# Patient Record
Sex: MALE | Race: WHITE | NOT HISPANIC OR LATINO
[De-identification: names, ages, dates, MRNs, and addresses within clinical notes are randomized per-mention and may not be internally consistent; named-entity substitution may affect disease eponyms.]

---

## 2018-02-24 ENCOUNTER — TRANSCRIPTION ENCOUNTER (OUTPATIENT)
Age: 58
End: 2018-02-24

## 2018-09-17 ENCOUNTER — TRANSCRIPTION ENCOUNTER (OUTPATIENT)
Age: 58
End: 2018-09-17

## 2019-08-14 ENCOUNTER — APPOINTMENT (OUTPATIENT)
Dept: PULMONOLOGY | Facility: CLINIC | Age: 59
End: 2019-08-14
Payer: COMMERCIAL

## 2019-08-14 VITALS
DIASTOLIC BLOOD PRESSURE: 82 MMHG | WEIGHT: 254 LBS | SYSTOLIC BLOOD PRESSURE: 126 MMHG | HEIGHT: 74 IN | HEART RATE: 92 BPM | BODY MASS INDEX: 32.6 KG/M2 | OXYGEN SATURATION: 95 %

## 2019-08-14 DIAGNOSIS — Z86.39 PERSONAL HISTORY OF OTHER ENDOCRINE, NUTRITIONAL AND METABOLIC DISEASE: ICD-10-CM

## 2019-08-14 DIAGNOSIS — Z86.79 PERSONAL HISTORY OF OTHER DISEASES OF THE CIRCULATORY SYSTEM: ICD-10-CM

## 2019-08-14 DIAGNOSIS — Z82.5 FAMILY HISTORY OF ASTHMA AND OTHER CHRONIC LOWER RESPIRATORY DISEASES: ICD-10-CM

## 2019-08-14 DIAGNOSIS — Z87.09 PERSONAL HISTORY OF OTHER DISEASES OF THE RESPIRATORY SYSTEM: ICD-10-CM

## 2019-08-14 DIAGNOSIS — Z78.9 OTHER SPECIFIED HEALTH STATUS: ICD-10-CM

## 2019-08-14 DIAGNOSIS — R93.89 ABNORMAL FINDINGS ON DIAGNOSTIC IMAGING OF OTHER SPECIFIED BODY STRUCTURES: ICD-10-CM

## 2019-08-14 DIAGNOSIS — R06.02 SHORTNESS OF BREATH: ICD-10-CM

## 2019-08-14 DIAGNOSIS — Z87.891 PERSONAL HISTORY OF NICOTINE DEPENDENCE: ICD-10-CM

## 2019-08-14 DIAGNOSIS — R94.2 ABNORMAL RESULTS OF PULMONARY FUNCTION STUDIES: ICD-10-CM

## 2019-08-14 DIAGNOSIS — E66.9 OBESITY, UNSPECIFIED: ICD-10-CM

## 2019-08-14 DIAGNOSIS — J98.11 ATELECTASIS: ICD-10-CM

## 2019-08-14 PROCEDURE — 94060 EVALUATION OF WHEEZING: CPT

## 2019-08-14 PROCEDURE — 94729 DIFFUSING CAPACITY: CPT

## 2019-08-14 PROCEDURE — 99204 OFFICE O/P NEW MOD 45 MIN: CPT | Mod: 25

## 2019-08-14 PROCEDURE — 94727 GAS DIL/WSHOT DETER LNG VOL: CPT

## 2019-08-14 PROCEDURE — 85018 HEMOGLOBIN: CPT | Mod: QW

## 2019-08-14 PROCEDURE — 94664 DEMO&/EVAL PT USE INHALER: CPT | Mod: 59

## 2019-08-14 RX ORDER — LISINOPRIL 10 MG/1
10 TABLET ORAL
Refills: 0 | Status: ACTIVE | COMMUNITY

## 2019-08-14 RX ORDER — LEVOTHYROXINE SODIUM 0.09 MG/1
88 TABLET ORAL
Refills: 0 | Status: ACTIVE | COMMUNITY

## 2019-08-14 RX ORDER — SIMVASTATIN 20 MG/1
20 TABLET, FILM COATED ORAL
Refills: 0 | Status: ACTIVE | COMMUNITY

## 2019-08-14 NOTE — REVIEW OF SYSTEMS
[Hypertension] : ~T hypertension [Thyroid Problem] : thyroid problem [Fever] : no fever [Dry Eyes] : no dryness of the eyes [Chills] : no chills [Nasal Congestion] : no nasal congestion [Eye Irritation] : no ~T irritation of the eyes [Postnasal Drip] : no postnasal drip [Epistaxis] : no nosebleeds [Cough] : no cough [Sinus Problems] : no sinus problems [Sputum] : not coughing up ~M sputum [Dyspnea] : no dyspnea [Chest Tightness] : no chest tightness [Pleuritic Pain] : no pleuritic pain [Chest Discomfort] : no chest discomfort [Wheezing] : no wheezing [Murmurs] : no murmurs were heard [Dysrhythmia] : no dysrhythmia [Palpitations] : no palpitations [Edema] : ~T edema was not present [Hay Fever] : no hay fever [Reflux] : no reflux [Itchy Eyes] : no itching of ~T the eyes [Nausea] : no nausea [Vomiting] : no vomiting [Diarrhea] : no diarrhea [Constipation] : no constipation [Dysuria] : no dysuria [Abdominal Pain] : no abdominal pain [Trauma] : no ~T physical trauma [Headache] : no headache [Anemia] : no anemia [Fracture] : no fracture [Dizziness] : no dizziness [Syncope] : no fainting [Numbness] : no numbness [Paralysis] : no paralysis was seen [Seizures] : no seizures [Anxiety] : no anxiety [Depression] : no depression [Diabetes] : no diabetes mellitus [Snoring] : no snoring [Nonrestorative Sleep] : restorative sleep [Witnessed Apneas] : demonstrated no ~M apnea

## 2019-08-14 NOTE — PHYSICAL EXAM
[General Appearance - Well Developed] : well developed [General Appearance - In No Acute Distress] : no acute distress [Normal Appearance] : normal appearance [Normal Conjunctiva] : the conjunctiva exhibited no abnormalities [Low Lying Soft Palate] : low lying soft palate [Enlarged Base of the Tongue] : enlargement of the base of the tongue [II] : II [Neck Appearance] : the appearance of the neck was normal [Heart Rate And Rhythm] : heart rate and rhythm were normal [Murmurs] : no murmurs present [Edema] : no peripheral edema present [Heart Sounds] : normal S1 and S2 [] : no respiratory distress [Respiration, Rhythm And Depth] : normal respiratory rhythm and effort [Exaggerated Use Of Accessory Muscles For Inspiration] : no accessory muscle use [Auscultation Breath Sounds / Voice Sounds] : lungs were clear to auscultation bilaterally [Abdomen Tenderness] : non-tender [Abdomen Soft] : soft [Nail Clubbing] : no clubbing of the fingernails [Abnormal Walk] : normal gait [Cyanosis, Localized] : no localized cyanosis [No Focal Deficits] : no focal deficits [Oriented To Time, Place, And Person] : oriented to person, place, and time [Elongated Uvula] : no elongated uvula [FreeTextEntry1] : No abnormalities.

## 2019-08-14 NOTE — CONSULT LETTER
[Dear  ___] : Dear  [unfilled], [Consult Letter:] : I had the pleasure of evaluating your patient, [unfilled]. [Sincerely,] : Sincerely, [Consult Closing:] : Thank you very much for allowing me to participate in the care of this patient.  If you have any questions, please do not hesitate to contact me. [Please see my note below.] : Please see my note below. [FreeTextEntry3] : Foster Londono MD, FCCP, EUGENIO. ABSM\par

## 2019-08-14 NOTE — REASON FOR VISIT
[Initial Evaluation] : an initial evaluation [Shortness of Breath] : shortness of Breath [Abnormal Chest X-Ray] : abnormal Chest X-Ray [FreeTextEntry2] : atelectasis

## 2019-08-14 NOTE — DISCUSSION/SUMMARY
[FreeTextEntry1] : \par #1. PFTs c/w mild restriction on spirometry with no significant BD response\par #2. The patient does not appear to require chronic BD therapy at this time\par #3. Offered ProAir as needed for reported wheeze but pt declined\par #4. Diet and exercise for weight loss\par #5. SOBOE is likely related to weight or deconditioning given restriction on PFTs\par #6. Chest CT to evaluate for ILD\par #7. Follow spirometry with weight loss\par #8. Pt denies sleep complaints

## 2019-08-14 NOTE — PROCEDURE
[FreeTextEntry1] : PFTs 8/14/19 - mild restriction on spirometry but normal lung volumes and diffusion capacity; there was no significant BD response.

## 2019-08-14 NOTE — HISTORY OF PRESENT ILLNESS
[Initial Evaluation] : an initial evaluation of [Excess Weight] : excess weight [Currently Experiencing] : The patient is currently experiencing symptoms. [Dyspnea] : dyspnea [Fair Compliance] : fair compliance with treatment [Low Calorie Diet] : low calorie diet [Poor Symptom Control] : poor symptom control [Fair Tolerance] : fair tolerance of treatment [Dyslipidemia] : dyslipidemia [Hypertension] : hypertension [High] : high [Low Calorie] : low calorie [Aerobic Conditioning] : aerobic conditioning [Infrequently] : exercises infrequently [Well Balanced Diet] : well balanced meals [FreeTextEntry1] : Denies GERD or PNDS but c/o SOBOE and intermittent wheeze otherwise without respiratory complaints. He is former light smoker of 5 cigarettes daily for 25 years but quit in 2000. He denies any h/o asthma, COPD, or sleep complaints. [Knee Pain] : no knee pain [Back Pain] : no back pain [Joint Pain] : no joint pain [On ___] : performed on [unfilled] [To Assess ___] : to assess [unfilled] [Patient] : the patient [None] : no new symptoms reported [FreeTextEntry9] : CXR [FreeTextEntry8] : ? RLL atelectasis

## 2019-09-19 ENCOUNTER — APPOINTMENT (OUTPATIENT)
Dept: PULMONOLOGY | Facility: CLINIC | Age: 59
End: 2019-09-19

## 2020-12-23 ENCOUNTER — NON-APPOINTMENT (OUTPATIENT)
Age: 60
End: 2020-12-23

## 2021-06-11 ENCOUNTER — NON-APPOINTMENT (OUTPATIENT)
Age: 61
End: 2021-06-11

## 2021-12-21 ENCOUNTER — TRANSCRIPTION ENCOUNTER (OUTPATIENT)
Age: 61
End: 2021-12-21

## 2022-06-28 ENCOUNTER — NON-APPOINTMENT (OUTPATIENT)
Age: 62
End: 2022-06-28